# Patient Record
(demographics unavailable — no encounter records)

---

## 2024-10-09 NOTE — PHYSICAL EXAM
[Normal Coordination] : normal coordination [Normal Sensation] : normal sensation [Normal Mood and Affect] : normal mood and affect [Oriented] : oriented [Able to Communicate] : able to communicate [Well Developed] : well developed [Well Nourished] : well nourished [Moderate] : moderate swelling of medial ankle [NL 30)] : inversion 30 degrees [NL (20)] : eversion 20 degrees [5___] : [facility] 5[unfilled]/5 [2+] : posterior tibialis pulse: 2+ [Normal] : saphenous nerve sensation normal [] : patient ambulates without assistive device [Right] : right ankle [There are no fractures, subluxations or dislocations. No significant abnormalities are seen] : There are no fractures, subluxations or dislocations. No significant abnormalities are seen [[id number]] : dorsiflexion 15 degrees [de-identified] : plantar flexion 20 degrees

## 2024-10-09 NOTE — DISCUSSION/SUMMARY
[de-identified] : We discussed arch support, formal PT, a home exercise program, ice therapy and the role of NSAIDS for the pain. These modalities will improve the patient's functionality in their activities of daily life.  Risks, benefits and contraindications were discussed.  The patient will follow up in 6 weeks or sooner as needed.

## 2024-10-09 NOTE — HISTORY OF PRESENT ILLNESS
[de-identified] : Patient denies any injury or trauma. He is c/o of RT medial ankle pain, which he has been treating with arthritis cream.